# Patient Record
(demographics unavailable — no encounter records)

---

## 2025-04-25 NOTE — RESULTS/DATA
[TextEntry] : Labs: 4/2/25 139/4.2/102/2447/1.87/gfr29/ca9 ACR >2295   8/10/23 145/5.2/107/27/31/1.39 ca 9.2 eGFR 42 8/28/23 at [facility]: 36, cr 1.7 (33)    5/3/23 141/4.7/105/27/31/1.28 gfr 46 ca 9.1 cbc 6.3/11.8/255 upcr 7.66  9/6/22: 142/4.7/107/26/40/1.22 ca 9.3 eGFR 50 CBC: 16/11.5/246    3/10/2022: 141/5/106/21/41/1.2, eGFR 47, calcium 10 CBC: 7. 9/12/3025  9/30/21: 139/4.9/101/25/31/096 Ca 9.4 CBC: 8.7/11.4/261  10/30/20: 139/4.5/102/24/43/1.11 GFR 53 ca 9.4  08/12/2020:142/4.7/104/25/35/1.01Calcium 9.4Chief of 60CBC:WBC 5.6Hemoglobin 12.4Platelet 59 7/11/20 140/4.5/103/24/51/1.24   6/25/20 146/4.7/105/26/55/1.34 ca 9.2 gfr 47urine pr 3+ ca 9.2  cbc:  6.4/11.6/247  Date: 6/18/20: Chem:  140/4.5/1 1/30/69/1.66, GFR 33, calcium 9.5 CBC:  8/11.9/is 282

## 2025-04-25 NOTE — ASSESSMENT
[FreeTextEntry1] : Assessment: CKD stage 3B: Renal function slightly worsened HTN: controlled Proteinuria, persistent  Blood glucose is not controlled No peripheral edema

## 2025-04-25 NOTE — PLAN
[TextEntry] : Plan: Repeat BMP Check SPEP UPEP UPCR AUDIE CKD stage 3B: Continue Jardiance 10 mg daily, Valsartan 320 mg daily,  HTN: Continue clonidine 0.1 mg, amlodipine 10 mg daily, Hydralazine 25 mg 2x daily Proteinuria: Low protein diet, Continue Jardiance 10 mg daily, Valsartan 320 mg daily Maintain physical activity  Follow up with recommended workups in 1 month

## 2025-04-25 NOTE — PHYSICAL EXAM
[TextEntry] : Physical Examination: General: Not in acute distress Head: Normocephalic, no lesions Eyes: [place], Fundi grossly normal Chest: Lungs clear, no rales, no rhonchi, no wheezes CVS: S1/S2, RR, no murmurs, no rubs Abdomen: Soft NT/ND, +BS Extremities: No edema, ulcers, or cyanosis Neurological: Alert, awake, no gross focal deficits

## 2025-04-25 NOTE — REVIEW OF SYSTEMS
[TextEntry] : Review of Systems: general: No weight loss, No recent fever, chills HEENT: no headache, no change in vision or hearing Pulmonary: No SOB, or cough CVS: No chest pain, HSU, or change in exercise tolerance Gastrointestinal: No Nausea/vomiting/constipation/diarrhea : No complaint of dysuria or urinary frequency, no excess foaming Skin: Denies no new rash, lesions, ulcer [place] skeletal: No edema, cyanosis, or new ulcers Neurological: No headache, dizziness, vertigo

## 2025-04-25 NOTE — HISTORY OF PRESENT ILLNESS
[FreeTextEntry1] : 04/25/25 Reason for visit Follow up for  CKD stage 3B HTN Proteinuria She hasn't been seen since 2023   PMHx GREYSON CKD stage 3B HTN Proteinuria Gout DM2 Anemia        past medical history of diabetes mellitus, hypertension, anemia, who was admitted to [facility] on 04/20/2020 complaining with nausea, vomiting and diarrhea after she was diagnosed with COVID-19 as outpatient prior to her admission to hospital.  She was found to have acute kidney injury that progressively worsened up to creatinine of more than 5 requiring initiation of hemodialysis.  She was also found right lower extremity DVT treated with anticoagulation complicated by bleeding drop in her hemoglobin requiring transfusion.  She had a prolonged hospital course and eventually she was discharged on 05/12/2020 and continued on hemodialysis twice a week until last a common her renal function shows some improvement and her dialysis was stopped now she comes back to the office for follow-up.  She denies any fever, chills nausea vomiting.  However she continues to have lower extremity edema for that her diuretics was recently increased to torsemide 20 mg twice a day.

## 2025-05-30 NOTE — ASSESSMENT
[FreeTextEntry1] : Assessment: CKD stage 3B: Renal function is essentially unchanged HTN: controlled Proteinuria, abnormal  No peripheral edema  Vitamin D is low

## 2025-05-30 NOTE — HISTORY OF PRESENT ILLNESS
[FreeTextEntry1] : 05/30/25 Reason for visit Follow up for  CKD stage 3B HTN Proteinuria PCP    04/25/25 Reason for visit Follow up for  CKD stage 3B HTN Proteinuria She hasn't been seen since 2023   PMHx GREYSON CKD stage 3B HTN Proteinuria Gout DM2 Anemia        past medical history of diabetes mellitus, hypertension, anemia, who was admitted to [facility] on 04/20/2020 complaining with nausea, vomiting and diarrhea after she was diagnosed with COVID-19 as outpatient prior to her admission to hospital.  She was found to have acute kidney injury that progressively worsened up to creatinine of more than 5 requiring initiation of hemodialysis.  She was also found right lower extremity DVT treated with anticoagulation complicated by bleeding drop in her hemoglobin requiring transfusion.  She had a prolonged hospital course and eventually she was discharged on 05/12/2020 and continued on hemodialysis twice a week until last a common her renal function shows some improvement and her dialysis was stopped now she comes back to the office for follow-up.  She denies any fever, chills nausea vomiting.  However she continues to have lower extremity edema for that her diuretics was recently increased to torsemide 20 mg twice a day.

## 2025-05-30 NOTE — RESULTS/DATA
[TextEntry] : Labs: 5/30/25 143/4.4/114/20/39/1.80/gfr30/ca9 UPCR >600 IgG kappa monoclonal pattern present AUDIE pos    4/2/25 139/4.2/102/2447/1.87/gfr29/ca9 ACR >2295   8/10/23 145/5.2/107/27/31/1.39 ca 9.2 eGFR 42 8/28/23 at [facility]: 36, cr 1.7 (33)    5/3/23 141/4.7/105/27/31/1.28 gfr 46 ca 9.1 cbc 6.3/11.8/255 upcr 7.66  9/6/22: 142/4.7/107/26/40/1.22 ca 9.3 eGFR 50 CBC: 16/11.5/246    3/10/2022: 141/5/106/21/41/1.2, eGFR 47, calcium 10 CBC: 7. 9/12/3025  9/30/21: 139/4.9/101/25/31/096 Ca 9.4 CBC: 8.7/11.4/261  10/30/20: 139/4.5/102/24/43/1.11 GFR 53 ca 9.4  08/12/2020:142/4.7/104/25/35/1.01Calcium 9.4Chief of 60CBC:WBC 5.6Hemoglobin 12.4Platelet 59 7/11/20 140/4.5/103/24/51/1.24   6/25/20 146/4.7/105/26/55/1.34 ca 9.2 gfr 47urine pr 3+ ca 9.2  cbc:  6.4/11.6/247  Date: 6/18/20: Chem:  140/4.5/1 1/30/69/1.66, GFR 33, calcium 9.5 CBC:  8/11.9/is 282

## 2025-05-30 NOTE — PLAN
[TextEntry] : Plan: Consider doing a renal biopsy if proteinuria is still worsened  Check C3, C4, immunofixation Start Vitamin D 50,000 U every 2 weeks CKD stage 3B: Continue Jardiance 10 mg daily, Valsartan 320 mg daily,  HTN: Continue clonidine 0.1 mg, amlodipine 10 mg daily, Hydralazine 25 mg 2x daily Proteinuria: Low protein diet, Continue Jardiance 10 mg daily, Valsartan 320 mg daily Maintain physical activity  Follow up with recommended workups in 2 months